# Patient Record
Sex: MALE | Race: BLACK OR AFRICAN AMERICAN | NOT HISPANIC OR LATINO | Employment: FULL TIME | URBAN - METROPOLITAN AREA
[De-identification: names, ages, dates, MRNs, and addresses within clinical notes are randomized per-mention and may not be internally consistent; named-entity substitution may affect disease eponyms.]

---

## 2018-01-18 ENCOUNTER — HOSPITAL ENCOUNTER (EMERGENCY)
Facility: HOSPITAL | Age: 23
Discharge: HOME | End: 2018-01-18
Attending: EMERGENCY MEDICINE

## 2018-01-18 VITALS
WEIGHT: 262 LBS | TEMPERATURE: 98 F | OXYGEN SATURATION: 99 % | SYSTOLIC BLOOD PRESSURE: 127 MMHG | RESPIRATION RATE: 16 BRPM | HEIGHT: 70 IN | DIASTOLIC BLOOD PRESSURE: 58 MMHG | BODY MASS INDEX: 37.51 KG/M2 | HEART RATE: 67 BPM

## 2018-01-18 DIAGNOSIS — M20.12 HALLUX VALGUS OF LEFT FOOT: Primary | ICD-10-CM

## 2018-01-18 DIAGNOSIS — M25.511 CHRONIC RIGHT SHOULDER PAIN: ICD-10-CM

## 2018-01-18 DIAGNOSIS — S90.32XA CONTUSION OF LEFT HEEL, INITIAL ENCOUNTER: ICD-10-CM

## 2018-01-18 DIAGNOSIS — G89.29 CHRONIC RIGHT SHOULDER PAIN: ICD-10-CM

## 2018-01-18 DIAGNOSIS — M79.672 PAIN OF LEFT HEEL: ICD-10-CM

## 2018-01-18 DIAGNOSIS — M79.675 GREAT TOE PAIN, LEFT: ICD-10-CM

## 2018-01-18 PROCEDURE — 63600175 PHARM REV CODE 636 W HCPCS: Performed by: NURSE PRACTITIONER

## 2018-01-18 PROCEDURE — 99283 EMERGENCY DEPT VISIT LOW MDM: CPT | Mod: 25

## 2018-01-18 PROCEDURE — 96372 THER/PROPH/DIAG INJ SC/IM: CPT

## 2018-01-18 RX ORDER — KETOROLAC TROMETHAMINE 30 MG/ML
30 INJECTION, SOLUTION INTRAMUSCULAR; INTRAVENOUS
Status: COMPLETED | OUTPATIENT
Start: 2018-01-18 | End: 2018-01-18

## 2018-01-18 RX ORDER — NAPROXEN 500 MG/1
500 TABLET ORAL 2 TIMES DAILY PRN
Qty: 30 TABLET | Refills: 0 | Status: SHIPPED | OUTPATIENT
Start: 2018-01-18

## 2018-01-18 RX ADMIN — KETOROLAC TROMETHAMINE 30 MG: 30 INJECTION, SOLUTION INTRAMUSCULAR at 02:01

## 2018-01-18 NOTE — DISCHARGE INSTRUCTIONS
Follow-up with orthopedics for your right shoulder pain. Follow-up with podiatry for your left toe pain. Take pain medication as needed and only as prescribed.      Return to the emergency department for any new or worsening symptoms or as needed.

## 2018-01-18 NOTE — ED PROVIDER NOTES
"Encounter Date: 1/18/2018    SCRIBE #1 NOTE: I, Rachel Gonsalez, am scribing for, and in the presence of,  JAIRO Freitas. I have scribed the following portions of the note - Other sections scribed: HPI/ROS .       History     Chief Complaint   Patient presents with    Shoulder Pain     Pt reports pain in right shoulder onset few months ago, denies injury.    Heel Pain     Pt reports dropped a dresser on left heal 2 wks ago, pain want stop.    Toe Pain     Pt left big toe pain onset 6 months ago while walking, no improvement.     Eye Problem     Pt also, reports both eyes are scratchy & irritated onset few days.      CC: Multiple Complaints    HPI: 22 y.o. M with no pertinent PMHx presents to the ED with multiple complaints.    1) Pt is c/o moderate L big toe pain with walking x6 months. Pt states he first noticed the pain while he was walking. Pt states, "I heard popped and then I saw swelling." Pain has not improved since onset. Pt is able to move his toe. Pt denies any erythema or warmth to the joint of the great toe.      2) Pt is c/o moderate L heel pain after dropping a dresser onto the back of his foot 2 weeks ago. Pt has not noticed any swelling or erythema. Pt has been able to ambulate since the incident.     3) Pt is c/o atraumatic, constant, moderate R shoulder pain x3 months. R shoulder pain is exacerbated with raising his R arm up. Pt reports pain feels better when he works out and that he has full ROM of his shoulder. Pt reports that he is going to the  and just wants to get everything checked out.     No prior tx attempted for any of his symptoms. Pt denies fever, chills, CP, SOB, and any other symptoms.         The history is provided by the patient.     Review of patient's allergies indicates:   Allergen Reactions    Pcn [penicillins] Swelling     Past Medical History:   Diagnosis Date    UTI (urinary tract infection)      Past Surgical History:   Procedure Laterality Date    WRIST " FRACTURE SURGERY       Family History   Problem Relation Age of Onset    Hypertension Mother      Social History   Substance Use Topics    Smoking status: Never Smoker    Smokeless tobacco: Never Used    Alcohol use Yes      Comment: occational      Review of Systems   Constitutional: Negative for chills and fever.   HENT: Negative for nosebleeds.    Eyes: Negative for pain.   Respiratory: Negative for cough and shortness of breath.    Cardiovascular: Negative for chest pain.   Gastrointestinal: Negative for diarrhea, nausea and vomiting.   Musculoskeletal: Negative for back pain and neck pain.        (+) R shoulder pain, L foot pain, L great toe pain w/ swelling   Skin: Negative for color change, rash and wound.   Neurological: Negative for weakness, numbness and headaches.       Physical Exam     Initial Vitals [01/18/18 0137]   BP Pulse Resp Temp SpO2   127/82 78 16 98 °F (36.7 °C) 98 %      MAP       97         Physical Exam    Nursing note and vitals reviewed.  Constitutional: He appears well-developed and well-nourished. He is not diaphoretic. No distress.   HENT:   Head: Normocephalic and atraumatic.   Right Ear: External ear normal.   Left Ear: External ear normal.   Nose: Nose normal.   Eyes: Conjunctivae and EOM are normal. Pupils are equal, round, and reactive to light. Right eye exhibits no discharge. Left eye exhibits no discharge. No scleral icterus.   Neck: Normal range of motion. Neck supple. No thyromegaly present. No tracheal deviation present. No JVD present.   Cardiovascular: Normal rate, regular rhythm, normal heart sounds and intact distal pulses. Exam reveals no gallop and no friction rub.    No murmur heard.  Pulmonary/Chest: Breath sounds normal. No stridor. No respiratory distress. He has no wheezes. He has no rhonchi. He has no rales. He exhibits no tenderness.   Abdominal: Soft. Bowel sounds are normal. He exhibits no distension and no mass. There is no tenderness. There is no rebound  and no guarding.   Musculoskeletal: Normal range of motion. He exhibits no edema.        Right shoulder: He exhibits tenderness and pain. He exhibits normal range of motion and no deformity.   Bunion of left great toe. No erythema, warmth, swelling, or any other abnormalities of the right shoulder or left heel   Lymphadenopathy:     He has no cervical adenopathy.   Neurological: He is alert and oriented to person, place, and time. He has normal strength and normal reflexes. He displays normal reflexes. No cranial nerve deficit or sensory deficit.   Skin: Skin is warm and dry. No rash and no abscess noted. No erythema. No pallor.   Psychiatric: He has a normal mood and affect. His behavior is normal. Judgment and thought content normal.         ED Course   Procedures  Labs Reviewed - No data to display          Medical Decision Making:   Differential Diagnosis:   Includes fracture, dislocation, Lisfranc injury, septic arthritis, gout, adhesive capsulitis, tendinitis, AC joint dislocation, others  Clinical Tests:   Radiological Study: Ordered and Reviewed  ED Management:  22-year-old male presenting for evaluation of chronic left great toe pain that began 6 months ago, chronic right shoulder pain that began 3 months ago, and left heel pain that began 2 weeks ago. He denies trauma to the left great toe and right shoulder, however he reports that left heel pain began after he accidentally dropped a piece of furniture onto it. Pain in the left heel and left toe are exacerbated by weightbearing and ambulation, however he is ambulating with a steady gait without difficulty. There is no erythema, warmth, or other abnormality. Doubt septic joint or gout. Right shoulder pain is also atraumatic and is exacerbated by palpation and lifting his shoulder above his head. Full active range of motion of the right shoulder is intact. There is no deformity, erythema, warmth, or any other abnormality noted to the right shoulder. X-ray  of the right shoulder is unremarkable. X-ray of the left foot is unremarkable for acute abnormality however there is prominent hallux valgus. No findings concerning for acute emergent condition. Patient is safe for discharge. Treated with Toradol. Recommend patient follow-up with orthopedics and podiatry. ED return precautions given. Patient expressed understanding of diagnosis and discharge instructions.  Other:   I have discussed this case with another health care provider.       <> Summary of the Discussion: Case discussed with my attending physician who agreed with the assessment and plan.            Scribe Attestation:   Scribe #1: I performed the above scribed service and the documentation accurately describes the services I performed. I attest to the accuracy of the note.    Attending Attestation:           Physician Attestation for Scribe:  Physician Attestation Statement for Scribe #1: I, Abiel Bullard NP-C, reviewed documentation, as scribed by Rachel Gonsalez in my presence, and it is both accurate and complete.                 ED Course      Clinical Impression:   The primary encounter diagnosis was Hallux valgus of left foot. Diagnoses of Pain of left heel, Great toe pain, left, Chronic right shoulder pain, and Contusion of left heel, initial encounter were also pertinent to this visit.    Disposition:   Disposition: Discharged  Condition: Stable                        Abiel Bullard NP  01/18/18 0317

## 2018-01-18 NOTE — ED TRIAGE NOTES
"Pt presents to ED with c/o right shoulder pain, left heel and left big toe pain, and irritated/scratchy eyes. Pt states he dropped a dresser on his left heel and his big toe "looks deformed" "I dont know if it always has been curved like that, it swole up before." Pt has full ROM of shoulder  "

## 2018-05-18 ENCOUNTER — HOSPITAL ENCOUNTER (EMERGENCY)
Facility: HOSPITAL | Age: 23
Discharge: HOME | End: 2018-05-18
Attending: EMERGENCY MEDICINE

## 2018-05-18 VITALS
HEART RATE: 80 BPM | OXYGEN SATURATION: 96 % | BODY MASS INDEX: 35.79 KG/M2 | HEIGHT: 70 IN | DIASTOLIC BLOOD PRESSURE: 76 MMHG | TEMPERATURE: 99 F | SYSTOLIC BLOOD PRESSURE: 153 MMHG | WEIGHT: 250 LBS | RESPIRATION RATE: 20 BRPM

## 2018-05-18 DIAGNOSIS — R05.9 COUGH: ICD-10-CM

## 2018-05-18 DIAGNOSIS — J32.9 SINUSITIS, UNSPECIFIED CHRONICITY, UNSPECIFIED LOCATION: Primary | ICD-10-CM

## 2018-05-18 DIAGNOSIS — J40 BRONCHITIS: ICD-10-CM

## 2018-05-18 LAB — HETEROPH AB SERPL QL IA: NEGATIVE

## 2018-05-18 PROCEDURE — 96372 THER/PROPH/DIAG INJ SC/IM: CPT

## 2018-05-18 PROCEDURE — 99284 EMERGENCY DEPT VISIT MOD MDM: CPT | Mod: 25

## 2018-05-18 PROCEDURE — 63600175 PHARM REV CODE 636 W HCPCS: Performed by: PHYSICIAN ASSISTANT

## 2018-05-18 PROCEDURE — 86308 HETEROPHILE ANTIBODY SCREEN: CPT

## 2018-05-18 PROCEDURE — 25000003 PHARM REV CODE 250: Performed by: PHYSICIAN ASSISTANT

## 2018-05-18 RX ORDER — ALBUTEROL SULFATE 90 UG/1
1-2 AEROSOL, METERED RESPIRATORY (INHALATION) EVERY 6 HOURS PRN
Qty: 1 INHALER | Refills: 1 | Status: SHIPPED | OUTPATIENT
Start: 2018-05-18 | End: 2019-05-18

## 2018-05-18 RX ORDER — DOXYCYCLINE 100 MG/1
100 CAPSULE ORAL 2 TIMES DAILY
Qty: 20 CAPSULE | Refills: 0 | Status: SHIPPED | OUTPATIENT
Start: 2018-05-18 | End: 2018-05-28

## 2018-05-18 RX ORDER — KETOROLAC TROMETHAMINE 30 MG/ML
10 INJECTION, SOLUTION INTRAMUSCULAR; INTRAVENOUS
Status: COMPLETED | OUTPATIENT
Start: 2018-05-18 | End: 2018-05-18

## 2018-05-18 RX ORDER — BENZONATATE 100 MG/1
100 CAPSULE ORAL
Status: COMPLETED | OUTPATIENT
Start: 2018-05-18 | End: 2018-05-18

## 2018-05-18 RX ORDER — BENZONATATE 100 MG/1
100 CAPSULE ORAL 3 TIMES DAILY PRN
Qty: 20 CAPSULE | Refills: 0 | Status: SHIPPED | OUTPATIENT
Start: 2018-05-18 | End: 2018-05-28

## 2018-05-18 RX ORDER — IBUPROFEN 600 MG/1
600 TABLET ORAL EVERY 6 HOURS PRN
Qty: 20 TABLET | Refills: 0 | Status: SHIPPED | OUTPATIENT
Start: 2018-05-18

## 2018-05-18 RX ADMIN — KETOROLAC TROMETHAMINE 10 MG: 30 INJECTION INTRAMUSCULAR; INTRAVENOUS at 12:05

## 2018-05-18 RX ADMIN — BENZONATATE 100 MG: 100 CAPSULE ORAL at 12:05

## 2018-05-18 NOTE — ED PROVIDER NOTES
Encounter Date: 5/18/2018  This is an initial triage evaluation of Dariusz aGrcia, a 22 y.o., male  that presents to the Emergency Department with c/o coughing, sneezing, body aches ×2 weeks.    Pertinent exam findings: Enlarged, nontender, preauricular lymph node left side    Orders Pending : None    Destination: Q track    I have evaluated and provided a medical screening exam with initial orders placed, if indicated, to expedite care. The patient is stable to return to the waiting area and will be placed in a treatment area when one is available. Care will be transferred to an alternate provider when patient is roomed from the lobby for full assessment including: history, physical exam, additional orders, and final disposition.      BROOKLYNN Wilder-BOOGIE        History     Chief Complaint   Patient presents with    Cough     cough, headache, and bodyaches x 2 wks; left side facial swelling    Generalized Body Aches      This is a 22-year-old male with no significant medical history who presents with sinus congestion, sore throat, cough, body aches x2 weeks.  He reports it started with mild sinus congestion and sore throat, but then progressed to thick yellow mucus from his nose with productive cough.  He denies fever at home but does report chills.  No sick contacts  Prior to symptoms.  He denies shortness of breath, difficulty swallowing, nausea vomiting, or diarrhea.          Review of patient's allergies indicates:   Allergen Reactions    Pcn [penicillins] Swelling     Past Medical History:   Diagnosis Date    UTI (urinary tract infection)      Past Surgical History:   Procedure Laterality Date    WRIST FRACTURE SURGERY       Family History   Problem Relation Age of Onset    Hypertension Mother      Social History   Substance Use Topics    Smoking status: Never Smoker    Smokeless tobacco: Never Used    Alcohol use Yes      Comment: occational      Review of Systems   Constitutional: Positive for  chills. Negative for fever.   HENT: Positive for congestion, rhinorrhea, sinus pain and sore throat.    Respiratory: Positive for cough. Negative for shortness of breath.    Cardiovascular: Negative for chest pain.   Gastrointestinal: Negative for nausea.   Genitourinary: Negative for dysuria.   Musculoskeletal: Positive for myalgias. Negative for back pain.   Skin: Negative for rash.   Neurological: Negative for weakness.   Hematological: Does not bruise/bleed easily.       Physical Exam     Initial Vitals [05/18/18 1157]   BP Pulse Resp Temp SpO2   131/64 80 20 98.6 °F (37 °C) 99 %      MAP       86.33         Physical Exam    Vitals reviewed.  Constitutional: He appears well-developed and well-nourished. He is not diaphoretic. No distress.   HENT:   Head: Normocephalic and atraumatic.   Right Ear: External ear normal.   Left Ear: External ear normal.   Nose: Mucosal edema and rhinorrhea present. Right sinus exhibits maxillary sinus tenderness. Right sinus exhibits no frontal sinus tenderness. Left sinus exhibits maxillary sinus tenderness. Left sinus exhibits no frontal sinus tenderness.   Mouth/Throat: Oropharynx is clear and moist. No oropharyngeal exudate.   Eyes: Conjunctivae are normal. No scleral icterus.   Neck: Normal range of motion. Neck supple.   Cardiovascular: Normal rate, regular rhythm, normal heart sounds and intact distal pulses.   Pulmonary/Chest: Breath sounds normal. No respiratory distress. He has no wheezes. He has no rhonchi. He has no rales. He exhibits no tenderness.   Abdominal: Soft. Bowel sounds are normal. He exhibits no distension and no mass. There is no tenderness. There is no rebound and no guarding.   Musculoskeletal: Normal range of motion.   Lymphadenopathy:     He has no cervical adenopathy.   Neurological: He is alert and oriented to person, place, and time.   Skin: Skin is warm and dry. No rash noted. No erythema.         ED Course   Procedures  Labs Reviewed   HETEROPHILE  AB SCREEN          X-Rays:   Independently Interpreted Readings:   Chest X-Ray: Normal heart size.  No infiltrates.     Medical Decision Making:   Initial Assessment:     22-year-old male complains of sinus congestion and pain with rhinorrhea, sore throat, cough, myalgias x2 weeks.  He presents nontoxic, afebrile, with normal heart rate.  He has mild maxillary sinus tenderness.  No clinical evidence of strep pharyngitis.  Lungs are clear with normal work of breathing.   ED Management:    Chest x-ray without obvious bacterial pneumonia.  Monospot negative.  Patient treated with  Doxycycline for bacterial sinusitis given duration of symptoms.   Patient treated supportively in the ED with mild improvement.  He was discharged with return precautions and instructions to follow up with PCP.  He verbalized understanding and agreed with the plan..                      Clinical Impression:   The primary encounter diagnosis was Sinusitis, unspecified chronicity, unspecified location. Diagnoses of Cough and Bronchitis were also pertinent to this visit.                           Junior Eng PA-C  05/18/18 1901

## 2019-01-05 ENCOUNTER — HOSPITAL ENCOUNTER (EMERGENCY)
Facility: HOSPITAL | Age: 24
Discharge: HOME | End: 2019-01-05
Attending: EMERGENCY MEDICINE
Payer: COMMERCIAL

## 2019-01-05 VITALS
HEIGHT: 70 IN | RESPIRATION RATE: 15 BRPM | WEIGHT: 244 LBS | BODY MASS INDEX: 34.93 KG/M2 | TEMPERATURE: 98 F | HEART RATE: 64 BPM | DIASTOLIC BLOOD PRESSURE: 69 MMHG | OXYGEN SATURATION: 100 % | SYSTOLIC BLOOD PRESSURE: 126 MMHG

## 2019-01-05 DIAGNOSIS — H01.004 BLEPHARITIS OF LEFT UPPER EYELID, UNSPECIFIED TYPE: Primary | ICD-10-CM

## 2019-01-05 PROCEDURE — 99284 EMERGENCY DEPT VISIT MOD MDM: CPT

## 2019-01-05 PROCEDURE — 25000003 PHARM REV CODE 250: Performed by: PHYSICIAN ASSISTANT

## 2019-01-05 RX ORDER — ERYTHROMYCIN 5 MG/G
OINTMENT OPHTHALMIC
Qty: 1 TUBE | Refills: 0 | Status: SHIPPED | OUTPATIENT
Start: 2019-01-05

## 2019-01-05 RX ORDER — ERYTHROMYCIN 5 MG/G
OINTMENT OPHTHALMIC
Status: COMPLETED | OUTPATIENT
Start: 2019-01-05 | End: 2019-01-05

## 2019-01-05 RX ORDER — IBUPROFEN 600 MG/1
600 TABLET ORAL
Status: COMPLETED | OUTPATIENT
Start: 2019-01-05 | End: 2019-01-05

## 2019-01-05 RX ORDER — IBUPROFEN 600 MG/1
600 TABLET ORAL EVERY 6 HOURS PRN
Qty: 20 TABLET | Refills: 0 | Status: SHIPPED | OUTPATIENT
Start: 2019-01-05

## 2019-01-05 RX ADMIN — ERYTHROMYCIN 1 INCH: 5 OINTMENT OPHTHALMIC at 08:01

## 2019-01-05 RX ADMIN — IBUPROFEN 600 MG: 600 TABLET ORAL at 08:01

## 2019-01-05 NOTE — ED TRIAGE NOTES
Patient report left eye swelling x 2 days.. Patient reports that eye is only painful when touched or when he blinks.  Patient denies vision changes. Swelling noted to left eye.

## 2019-01-05 NOTE — ED PROVIDER NOTES
Encounter Date: 1/5/2019    SCRIBE #1 NOTE: I, Cydney Jesus, am scribing for, and in the presence of,  Junior Eng PA-C. I have scribed the following portions of the note - Other sections scribed: HPI, ROS.       History     Chief Complaint   Patient presents with    Facial Swelling     left eye swelling x several days.   + pain, denies itching.  denies vision changes.     CC: Facial Swelling    HPI: This is a 24 y/o male with no pertinent PMHx who presents to the ED for emergent evaluation of acute onset left eyelid swelling and pain that began x2 days ago. Pt reports that he began to have mild pain to palpation of eyelid x3 days ago, but pain became more severe x2 days ago when swelling onset. Pain and swelling have both gradually worsened each day. Pt reports that he took Benadryl with no relief of symptoms. Pt notes that blinking is painful. Pt notes hx of styes. Pt denies vision changes, itching, or further symptoms.       The history is provided by the patient. No  was used.     Review of patient's allergies indicates:   Allergen Reactions    Pcn [penicillins] Swelling     Past Medical History:   Diagnosis Date    UTI (urinary tract infection)      Past Surgical History:   Procedure Laterality Date    WRIST FRACTURE SURGERY       Family History   Problem Relation Age of Onset    Hypertension Mother      Social History     Tobacco Use    Smoking status: Never Smoker    Smokeless tobacco: Never Used   Substance Use Topics    Alcohol use: Yes     Comment: occational     Drug use: No     Review of Systems   Constitutional: Negative for chills and fever.   HENT: Negative for congestion, ear pain, rhinorrhea and sore throat.    Eyes: Positive for pain (to left eyelid). Negative for visual disturbance.        (+) left eyelid swelling   Respiratory: Negative for cough and shortness of breath.    Cardiovascular: Negative for chest pain.   Gastrointestinal: Negative for abdominal pain,  diarrhea, nausea and vomiting.   Genitourinary: Negative for dysuria.   Musculoskeletal: Negative for back pain and neck pain.   Skin: Negative for rash.   Neurological: Negative for headaches.       Physical Exam     Initial Vitals [01/05/19 0739]   BP Pulse Resp Temp SpO2   126/69 64 15 98 °F (36.7 °C) 100 %      MAP       --         Physical Exam    Vitals reviewed.  Constitutional: He appears well-developed and well-nourished. He is not diaphoretic. No distress.   HENT:   Head: Normocephalic and atraumatic.   Right Ear: External ear normal.   Left Ear: External ear normal.   Nose: Nose normal.   Eyes: Conjunctivae and EOM are normal. Pupils are equal, round, and reactive to light. Lids are everted and swept, no foreign bodies found. Right eye exhibits no chemosis, no discharge, no exudate and no hordeolum. No foreign body present in the right eye. Left eye exhibits no chemosis, no discharge, no exudate and no hordeolum. No foreign body present in the left eye. Right conjunctiva is not injected. Right conjunctiva has no hemorrhage. Left conjunctiva is not injected. Left conjunctiva has no hemorrhage. No scleral icterus.   Left upper eyelid with generalized edema and mild erythema.  No discrete lesions to the anterior or posterior lid.  No pain with ocular movement.  No proptosis.   Neck: Normal range of motion. Neck supple.   Cardiovascular: Normal rate, regular rhythm and intact distal pulses.   Pulmonary/Chest: No respiratory distress.   Musculoskeletal: Normal range of motion.   Neurological: He is alert and oriented to person, place, and time.   Skin: Skin is warm and dry. No rash noted.         ED Course   Procedures  Labs Reviewed - No data to display       Imaging Results    None          Medical Decision Making:   ED Management:  23-year-old male with left eyelid pain and swelling x2 days.  He has no ocular pain, pain with ocular movement, decreased vision, or crusting.  His left upper eyelid has  generalized edema and mild erythema.  No discrete lesions or foreign bodies identified.  Will treat for blepharitis with erythromycin ointment and ibuprofen.  After considered but at this time do not suspect orbital cellulitis, periorbital cellulitis, stye, other serious pathology.  Patient given return precautions and follow-up instructions.  She verbalized understanding and agreed with plan.            Scribe Attestation:   Scribe #1: I performed the above scribed service and the documentation accurately describes the services I performed. I attest to the accuracy of the note.    Attending Attestation:           Physician Attestation for Scribe:  Physician Attestation Statement for Scribe #1: I, Junior Eng PA-C, reviewed documentation, as scribed by Cydney Jesus in my presence, and it is both accurate and complete.                    Clinical Impression:   The encounter diagnosis was Blepharitis of left upper eyelid, unspecified type.                             Junior Eng PA-C  01/05/19 1143

## 2019-01-14 ENCOUNTER — HOSPITAL ENCOUNTER (EMERGENCY)
Facility: HOSPITAL | Age: 24
Discharge: HOME | End: 2019-01-14
Attending: EMERGENCY MEDICINE

## 2019-01-14 ENCOUNTER — NURSE TRIAGE (OUTPATIENT)
Dept: ADMINISTRATIVE | Facility: CLINIC | Age: 24
End: 2019-01-14

## 2019-01-14 VITALS
BODY MASS INDEX: 34.79 KG/M2 | RESPIRATION RATE: 16 BRPM | WEIGHT: 243 LBS | HEART RATE: 65 BPM | TEMPERATURE: 99 F | OXYGEN SATURATION: 100 % | DIASTOLIC BLOOD PRESSURE: 76 MMHG | HEIGHT: 70 IN | SYSTOLIC BLOOD PRESSURE: 126 MMHG

## 2019-01-14 DIAGNOSIS — K59.00 CONSTIPATION, UNSPECIFIED CONSTIPATION TYPE: Primary | ICD-10-CM

## 2019-01-14 PROCEDURE — 99283 EMERGENCY DEPT VISIT LOW MDM: CPT

## 2019-01-14 NOTE — ED PROVIDER NOTES
Encounter Date: 1/14/2019    SCRIBE #1 NOTE: I, Julisa Rivera, am scribing for, and in the presence of,  Renuka North MD. I have scribed the following portions of the note - Other sections scribed: HPI, ROS.       History     Chief Complaint   Patient presents with    Constipation     no BM x 4 days.  complaints of lower abd pains.   denies n/v or fever.     CC: Constipation    HPI: This is a 23 y.o. M who has no pertinent PMHx who presents to the ED for emergent evaluation of constipation x's 4 days. Pt notes flatulence this morning and lower abdominal cramping. He took an enema without relief. He states that he has been eating and drinking okay. Pt reports that he does not have a frequent Hx of constipation. He was last constipated last week and once again 6 years ago. Pt denies fever, cough, sore throat, vomiting, difficulty urinating, or Hx of abdominal surgery.      The history is provided by the patient. No  was used.     Review of patient's allergies indicates:   Allergen Reactions    Pcn [penicillins] Swelling     Past Medical History:   Diagnosis Date    UTI (urinary tract infection)      Past Surgical History:   Procedure Laterality Date    WRIST FRACTURE SURGERY       Family History   Problem Relation Age of Onset    Hypertension Mother      Social History     Tobacco Use    Smoking status: Never Smoker    Smokeless tobacco: Never Used   Substance Use Topics    Alcohol use: Yes     Comment: occational     Drug use: No     Review of Systems   Constitutional: Negative for chills and fever.   HENT: Negative for ear pain and sore throat.    Eyes: Negative for pain.   Respiratory: Negative for cough and shortness of breath.    Cardiovascular: Negative for chest pain.   Gastrointestinal: Positive for constipation. Negative for abdominal pain, diarrhea, nausea and vomiting.   Genitourinary: Negative for difficulty urinating and dysuria.   Musculoskeletal: Negative for back pain.    Skin: Negative for rash.   Neurological: Negative for headaches.       Physical Exam     Initial Vitals [01/14/19 0824]   BP Pulse Resp Temp SpO2   131/66 66 16 98.6 °F (37 °C) 98 %      MAP       --         Physical Exam    Constitutional: He appears well-developed and well-nourished. He is not diaphoretic. No distress.   HENT:   Mouth/Throat: Oropharynx is clear and moist.   Eyes: Pupils are equal, round, and reactive to light.   Neck: Neck supple.   Cardiovascular: Normal rate and regular rhythm.   Pulmonary/Chest: No respiratory distress.   Abdominal: Soft. Bowel sounds are normal. There is no tenderness.   Genitourinary: Rectal exam shows no external hemorrhoid, no internal hemorrhoid, no mass, no tenderness, anal tone normal and guaiac negative stool. Guaiac negative stool. : Acceptable.  Genitourinary Comments: QUINN performed with RN present as chaperone   Neurological: He is alert.   Skin: Skin is warm and dry.   Psychiatric: He has a normal mood and affect.         ED Course   Procedures  Labs Reviewed - No data to display       Imaging Results    None          Medical Decision Making:   Initial Assessment:   23-year-old male presents with 4 days of constipation.  Abdomen is soft, nondistended. Rectal exam without acute finding.  No relief with enema performed at home.  No fever or vomiting, patient is still passing flatness.  I do not suspect acute bowel obstruction versus appendicitis versus the enteritis versus volvulus.  I advised use of milk of magnesia.  I will also prescribe lactulose if milk of magnesia not effective.  I have referred him to primary care for further evaluation ongoing care.            Scribe Attestation:   Scribe #1: I performed the above scribed service and the documentation accurately describes the services I performed. I attest to the accuracy of the note.    Attending Attestation:           Physician Attestation for Scribe:  Physician Attestation Statement for  Scribe #1: I, Renuka North MD, reviewed documentation, as scribed by Julisa Rivera in my presence, and it is both accurate and complete.                    Clinical Impression:   The encounter diagnosis was Constipation, unspecified constipation type.                             Renuka North MD  01/14/19 0846

## 2019-01-14 NOTE — TELEPHONE ENCOUNTER
"Was seen this am for fecal impaction, prescribed lactulose, but to try MOM first. The lactulose is $300 and he is unable to afford it.  He has already taking stool softness, and an enema, as well.  He feels the urge to go, but it's so painful that he is unable to pass it.  S/w Dr. North in the ED, she advises Mag citrate and glycerine suppository, advised pt of the instructions and to call back if no success.    Reason for Disposition   Caller has URGENT medication question about med that PCP prescribed and triager unable to answer question    Answer Assessment - Initial Assessment Questions  1. SYMPTOMS: "Do you have any symptoms?"      Constipation/fecal impaction  2. SEVERITY: If symptoms are present, ask "Are they mild, moderate or severe?"      Painful rectum, unable to pass stool.  Was seen in Ed this am and given rx for lactulose, but it is $300 and he can't afford it.    Protocols used: ST MEDICATION QUESTION CALL-A-      "

## 2019-04-08 ENCOUNTER — HOSPITAL ENCOUNTER (EMERGENCY)
Facility: HOSPITAL | Age: 24
Discharge: HOME | End: 2019-04-09
Attending: EMERGENCY MEDICINE

## 2019-04-08 DIAGNOSIS — J02.0 STREP PHARYNGITIS: Primary | ICD-10-CM

## 2019-04-08 DIAGNOSIS — R07.9 CHEST PAIN: ICD-10-CM

## 2019-04-08 LAB
CTP QC/QA: YES
DEPRECATED S PYO AG THROAT QL EIA: POSITIVE
FLUAV AG NPH QL: NEGATIVE
FLUBV AG NPH QL: NEGATIVE

## 2019-04-08 PROCEDURE — 63600175 PHARM REV CODE 636 W HCPCS: Performed by: PHYSICIAN ASSISTANT

## 2019-04-08 PROCEDURE — 93010 ELECTROCARDIOGRAM REPORT: CPT | Mod: ,,, | Performed by: INTERNAL MEDICINE

## 2019-04-08 PROCEDURE — 93010 EKG 12-LEAD: ICD-10-PCS | Mod: ,,, | Performed by: INTERNAL MEDICINE

## 2019-04-08 PROCEDURE — 93005 ELECTROCARDIOGRAM TRACING: CPT

## 2019-04-08 PROCEDURE — 99284 EMERGENCY DEPT VISIT MOD MDM: CPT | Mod: 25

## 2019-04-08 PROCEDURE — 96372 THER/PROPH/DIAG INJ SC/IM: CPT

## 2019-04-08 PROCEDURE — 87880 STREP A ASSAY W/OPTIC: CPT

## 2019-04-08 PROCEDURE — 87804 INFLUENZA ASSAY W/OPTIC: CPT | Mod: 59

## 2019-04-08 RX ORDER — AZITHROMYCIN 250 MG/1
TABLET, FILM COATED ORAL
Qty: 6 TABLET | Refills: 0 | Status: SHIPPED | OUTPATIENT
Start: 2019-04-08 | End: 2020-12-14

## 2019-04-08 RX ORDER — KETOROLAC TROMETHAMINE 30 MG/ML
15 INJECTION, SOLUTION INTRAMUSCULAR; INTRAVENOUS
Status: COMPLETED | OUTPATIENT
Start: 2019-04-08 | End: 2019-04-08

## 2019-04-08 RX ADMIN — KETOROLAC TROMETHAMINE 15 MG: 30 INJECTION, SOLUTION INTRAMUSCULAR at 09:04

## 2019-04-09 VITALS
TEMPERATURE: 99 F | OXYGEN SATURATION: 98 % | WEIGHT: 250 LBS | BODY MASS INDEX: 35.79 KG/M2 | HEIGHT: 70 IN | RESPIRATION RATE: 18 BRPM | HEART RATE: 68 BPM | DIASTOLIC BLOOD PRESSURE: 76 MMHG | SYSTOLIC BLOOD PRESSURE: 120 MMHG

## 2019-04-09 NOTE — ED PROVIDER NOTES
Encounter Date: 4/8/2019       History     Chief Complaint   Patient presents with    Sore Throat     sore throat, fever, headache since Thursday. Denies cough, nv.    Chest Pain     intermittent Midsternal chest pain since Thursday     Chief Complaint:  Sore throat/chest pain  History of  Present Illness: History obtained from patient. This 23 y.o. male who has no known past medical history presents to the ED complaining of sore throat for 5 days with associated generalized headache, congestion and subjective fever.  He also complains of intermittent midsternal chest pain for similar duration.  Denies nausea, vomiting, diarrhea, abdominal pain, shortness of breath        Review of patient's allergies indicates:   Allergen Reactions    Pcn [penicillins] Swelling     Past Medical History:   Diagnosis Date    UTI (urinary tract infection)      Past Surgical History:   Procedure Laterality Date    WRIST FRACTURE SURGERY       Family History   Problem Relation Age of Onset    Hypertension Mother      Social History     Tobacco Use    Smoking status: Never Smoker    Smokeless tobacco: Never Used   Substance Use Topics    Alcohol use: Yes     Comment: occational     Drug use: No     Review of Systems   Constitutional: Positive for fever. Negative for chills.   HENT: Positive for congestion and sore throat. Negative for rhinorrhea.    Eyes: Negative for visual disturbance.   Respiratory: Negative for cough and shortness of breath.    Cardiovascular: Positive for chest pain.   Gastrointestinal: Negative for abdominal pain, diarrhea, nausea and vomiting.   Genitourinary: Negative for dysuria, frequency and hematuria.   Musculoskeletal: Positive for myalgias. Negative for back pain.   Skin: Negative for rash.   Neurological: Negative for dizziness, weakness and headaches.       Physical Exam     Initial Vitals [04/08/19 2024]   BP Pulse Resp Temp SpO2   (!) 141/80 81 17 98.5 °F (36.9 °C) 99 %      MAP       --          Physical Exam    Nursing note and vitals reviewed.  Constitutional: He appears well-developed and well-nourished. No distress.   HENT:   Head: Normocephalic and atraumatic.   Right Ear: Tympanic membrane normal.   Left Ear: Tympanic membrane normal.   Nose: Nose normal.   Mouth/Throat: Uvula is midline and mucous membranes are normal. Posterior oropharyngeal erythema present.   There is bilateral tonsillar enlargement without exudates.   Eyes: EOM are normal. Pupils are equal, round, and reactive to light.   Neck: Normal range of motion. Neck supple.   Cardiovascular: Normal rate, regular rhythm and normal heart sounds. Exam reveals no gallop and no friction rub.    No murmur heard.  Pulmonary/Chest: Breath sounds normal. No respiratory distress. He has no wheezes. He has no rhonchi. He has no rales.   Abdominal: Soft. Bowel sounds are normal. He exhibits no mass. There is no tenderness. There is no rebound and no guarding.   Musculoskeletal: Normal range of motion.   Lymphadenopathy:     He has cervical adenopathy.   Neurological: He is alert and oriented to person, place, and time.   Skin: Skin is warm and dry. Capillary refill takes less than 2 seconds.   Psychiatric: He has a normal mood and affect.         ED Course   Procedures  Labs Reviewed   THROAT SCREEN, RAPID - Abnormal; Notable for the following components:       Result Value    Rapid Strep A Screen Positive (*)     All other components within normal limits   POCT INFLUENZA A/B     EKG Readings: (Independently Interpreted)   Initial Reading: No STEMI. Rhythm: Sinus Bradycardia. Heart Rate: 57. Ectopy: No Ectopy. Conduction: Normal. ST Segments: Normal ST Segments. T Waves: Normal. Axis: Normal. Clinical Impression: Sinus Bradycardia     ECG Results          EKG 12-lead (Final result)  Result time 04/09/19 15:38:44    Final result by Interface, Lab In [facility] (04/09/19 15:38:44)                 Narrative:    Test Reason : R07.9,    Vent. Rate : 074  BPM     Atrial Rate : 074 BPM     P-R Int : 144 ms          QRS Dur : 106 ms      QT Int : 368 ms       P-R-T Axes : 060 024 -04 degrees     QTc Int : 408 ms    Normal sinus rhythm with sinus arrhythmia  Cannot rule out Anterior infarct ,age undetermined  Abnormal ECG  No previous ECGs available  Confirmed by Parish Hatch MD ([id number]) on 4/9/2019 3:38:31 PM    Referred By: [email or web address]   SELF           Confirmed By:Parish Hatch MD                             EKG 12-LEAD (Final result)  Result time 04/11/19 14:57:47    Final result by Unknown User (04/11/19 14:57:47)                                Imaging Results          X-Ray Chest PA And Lateral (Final result)  Result time 04/08/19 21:38:55    Final result by Kasi Zhou MD (04/08/19 21:38:55)                 Impression:      1. No acute cardiopulmonary process, hypoventilatory exam.      Electronically signed by: Kasi Zhou MD  Date:    04/08/2019  Time:    21:38             Narrative:    EXAMINATION:  XR CHEST PA AND LATERAL    CLINICAL HISTORY:  Chest pain, unspecified    TECHNIQUE:  PA and lateral views of the chest were performed.    COMPARISON:  05/18/2018    FINDINGS:  The cardiomediastinal silhouette is not enlarged.  There is no pleural effusion.  The trachea is midline.  The lungs are symmetrically expanded bilaterally without evidence of acute parenchymal process. No large focal consolidation seen.  There is no pneumothorax.  The osseous structures are unremarkable.                                 Medical Decision Making:   Clinical Tests:   Lab Tests: Ordered and Reviewed  Radiological Study: Ordered and Reviewed  ED Management:  23 year old patient with no significant past medical history presenting secondary to chest pain and sore throat. Patient is at lower risk of ACS due to risk factors and HPI with a heart score of 0.  Posterior oropharynx is erythematous without tonsillar edema or exudates. EKG was reassuring and chest xray showed  nothing acute. Most likely musculoskeletal cause of patient.  Patient positive for strep.  Influenza negative.  No history of immunocompromise. Pt euvolemic w no trismus and no airway compromise. Able to tolerate PO.      PE: normal rate, no sob/recent immobilization/surgery/travel/family history. PERC negative.  Pneumonia: chest xray negative. No fever. No cough and lungs non consistent with pna  Tamponade: unlikely due to chest xray and ekg  STEMI: No STEMI on ekg  Dissection: equal pulses bilaterally and no ripping chest pain to the back  Esophageal rupture: no dysphagia or vomiting and chest xray negative for mediastinal air  Arrhythmia: no arrhythmia on ekg  CHF: no fluid overload on Cxr and physical exam  Pneumothorax: bilateral breath sounds and no signs of pneumothorax on chest xray   Strep throat: No LAD, cough present, afebrile, no pharyngeal exudate  EBV/Mono: No prolonged course, no posterior LAD, no splenomegaly  HIV: No LAD, No GI upset, no skin rash, not sexually active, not IVDU  Peritonsillar abscess: No LAD, no hot potato voice, no uvular displacement, no redness or swelling in tonsillar area, afebrile  Retropharyngeal abscess: No neck pain, no dysphagia, No LAD, no croup like cough, afebrile  No obstructive processes such as obstructive goiter or ludwigs angina    Plan to DC home with azithromycin given penicillin allergy. Return precautions given, patient understands and agrees with plan. All questions answered.  Instructed to follow up with PCP.    Patient felt improved with interventions and has a lower risk of impending cardiac failure to the heart score of 0. Patient was discharged with follow up with primary care. Return precautions given, patient understands and agrees with plan. All questions answered.  Instructed to follow up with PCP.                         Clinical Impression:       ICD-10-CM ICD-9-CM   1. Strep pharyngitis J02.0 034.0   2. Chest pain R07.9 786.50                                 Elmer Walls PA-C  04/09/19 0305       Elmer Walls PA-C  04/26/19 0314

## 2019-04-09 NOTE — ED TRIAGE NOTES
Pt here for midsternal chest pain that comes and goes. Hx acid reflux. C/o sore throat. Using otc meds without relief.

## 2020-03-18 ENCOUNTER — OFFICE VISIT (OUTPATIENT)
Dept: FAMILY MEDICINE | Facility: CLINIC | Age: 25
End: 2020-03-18
Payer: COMMERCIAL

## 2020-03-18 VITALS
HEIGHT: 70 IN | TEMPERATURE: 99 F | BODY MASS INDEX: 38.41 KG/M2 | HEART RATE: 77 BPM | WEIGHT: 268.31 LBS | OXYGEN SATURATION: 98 % | DIASTOLIC BLOOD PRESSURE: 76 MMHG | SYSTOLIC BLOOD PRESSURE: 120 MMHG

## 2020-03-18 DIAGNOSIS — E66.01 SEVERE OBESITY (BMI 35.0-39.9) WITH COMORBIDITY: ICD-10-CM

## 2020-03-18 DIAGNOSIS — J06.9 UPPER RESPIRATORY INFECTION WITH COUGH AND CONGESTION: Primary | ICD-10-CM

## 2020-03-18 PROCEDURE — 99999 PR PBB SHADOW E&M-EST. PATIENT-LVL III: ICD-10-PCS | Mod: PBBFAC,,, | Performed by: INTERNAL MEDICINE

## 2020-03-18 PROCEDURE — 99999 PR PBB SHADOW E&M-EST. PATIENT-LVL III: CPT | Mod: PBBFAC,,, | Performed by: INTERNAL MEDICINE

## 2020-03-18 PROCEDURE — 99204 PR OFFICE/OUTPT VISIT, NEW, LEVL IV, 45-59 MIN: ICD-10-PCS | Mod: S$GLB,,, | Performed by: INTERNAL MEDICINE

## 2020-03-18 PROCEDURE — 99204 OFFICE O/P NEW MOD 45 MIN: CPT | Mod: S$GLB,,, | Performed by: INTERNAL MEDICINE

## 2020-03-18 RX ORDER — PROMETHAZINE HYDROCHLORIDE AND DEXTROMETHORPHAN HYDROBROMIDE 6.25; 15 MG/5ML; MG/5ML
5 SYRUP ORAL 2 TIMES DAILY PRN
Qty: 120 ML | Refills: 0 | Status: SHIPPED | OUTPATIENT
Start: 2020-03-18 | End: 2020-03-28

## 2020-03-18 NOTE — PROGRESS NOTES
SUBJECTIVE     Chief Complaint   Patient presents with    Establish Care       HPI  Dariusz Neo Garcia is a 24 y.o. male with multiple medical diagnoses as listed in the medical history and problem list that presents for evaluation of URI since Saturday. Pt reports nasal/chest congestion, productive cough, headache, and post-nasal drip. Denies any fever, chills, or night sweats. Pt has been taking Nighttime Cold and Flu. Denies any recent travel. +sick contacts(entire family/household with URI).    PAST MEDICAL HISTORY:  Past Medical History:   Diagnosis Date    UTI (urinary tract infection)        PAST SURGICAL HISTORY:  Past Surgical History:   Procedure Laterality Date    WRIST FRACTURE SURGERY         SOCIAL HISTORY:  Social History     Socioeconomic History    Marital status: Single     Spouse name: Not on file    Number of children: Not on file    Years of education: Not on file    Highest education level: Not on file   Occupational History    Not on file   Social Needs    Financial resource strain: Not on file    Food insecurity:     Worry: Not on file     Inability: Not on file    Transportation needs:     Medical: Not on file     Non-medical: Not on file   Tobacco Use    Smoking status: Never Smoker    Smokeless tobacco: Never Used   Substance and Sexual Activity    Alcohol use: Yes     Comment: occational     Drug use: No    Sexual activity: Yes     Partners: Female     Birth control/protection: Condom   Lifestyle    Physical activity:     Days per week: Not on file     Minutes per session: Not on file    Stress: Not at all   Relationships    Social connections:     Talks on phone: Not on file     Gets together: Not on file     Attends Religion service: Not on file     Active member of club or organization: Not on file     Attends meetings of clubs or organizations: Not on file     Relationship status: Not on file   Other Topics Concern    Not on file   Social History Narrative     Not on file       FAMILY HISTORY:  Family History   Problem Relation Age of Onset    Hypertension Mother        ALLERGIES AND MEDICATIONS: updated and reviewed.  Review of patient's allergies indicates:   Allergen Reactions    Pcn [penicillins] Swelling     Current Outpatient Medications   Medication Sig Dispense Refill    AFLURIA QD 2019-20,3YR UP,,PF, 60 mcg (15 mcg x 4)/0.5 mL Syrg ADM 0.5ML IM UTD      albuterol 90 mcg/actuation inhaler Inhale 1-2 puffs into the lungs every 6 (six) hours as needed for Wheezing. 1 Inhaler 1    azithromycin (Z-EMILIO) 250 MG tablet Take 2 tablets by mouth on day 1; Take 1 tablet by mouth on days 2-5 (Patient not taking: Reported on 3/18/2020) 6 tablet 0    bismuth subsalicylate (BISMUTH) 262 mg Chew Take by mouth.      erythromycin (ROMYCIN) ophthalmic ointment Place a 1/2 inch ribbon of ointment into the left lower eyelid 3 times daily for 7 days. (Patient not taking: Reported on 3/18/2020) 1 Tube 0    ibuprofen (ADVIL,MOTRIN) 600 MG tablet Take 1 tablet (600 mg total) by mouth every 6 (six) hours as needed for Pain. (Patient not taking: Reported on 3/18/2020) 20 tablet 0    ibuprofen (ADVIL,MOTRIN) 600 MG tablet Take 1 tablet (600 mg total) by mouth every 6 (six) hours as needed for Pain. (Patient not taking: Reported on 3/18/2020) 20 tablet 0    lactulose (CEPHULAC) 20 gram Pack Take 1 packet (20 g total) by mouth 3 (three) times daily as needed (constipation). (Patient not taking: Reported on 3/18/2020) 30 packet 0    naproxen (NAPROSYN) 500 MG tablet Take 1 tablet (500 mg total) by mouth 2 (two) times daily as needed (for pain). (Patient not taking: Reported on 3/18/2020) 30 tablet 0    promethazine-dextromethorphan (PROMETHAZINE-DM) 6.25-15 mg/5 mL Syrp Take 5 mLs by mouth 2 (two) times daily as needed. 120 mL 0     No current facility-administered medications for this visit.        ROS  Review of Systems   Constitutional: Negative for chills and fever.   HENT:  "Positive for congestion and postnasal drip. Negative for hearing loss and sore throat.    Eyes: Negative for visual disturbance.   Respiratory: Positive for cough. Negative for shortness of breath.    Cardiovascular: Negative for chest pain, palpitations and leg swelling.   Gastrointestinal: Negative for abdominal pain, constipation, diarrhea, nausea and vomiting.   Genitourinary: Negative for dysuria, frequency and urgency.   Musculoskeletal: Negative for arthralgias, joint swelling and myalgias.   Skin: Negative for rash and wound.   Neurological: Positive for headaches.   Psychiatric/Behavioral: Negative for agitation and confusion. The patient is not nervous/anxious.          OBJECTIVE     Physical Exam  Vitals:    03/18/20 1356   BP: 120/76   Pulse: 77   Temp: 98.7 °F (37.1 °C)    Body mass index is 38.5 kg/m².  Weight: 121.7 kg (268 lb 4.8 oz)   Height: 5' 10" (177.8 cm)     Physical Exam   Constitutional: He is oriented to person, place, and time. He appears well-developed and well-nourished. No distress.   HENT:   Head: Normocephalic and atraumatic.   Right Ear: Hearing, tympanic membrane and external ear normal.   Left Ear: Hearing, tympanic membrane and external ear normal.   Nose: Nose normal. No rhinorrhea. Right sinus exhibits no maxillary sinus tenderness and no frontal sinus tenderness. Left sinus exhibits no maxillary sinus tenderness and no frontal sinus tenderness.   Mouth/Throat: No uvula swelling. Posterior oropharyngeal erythema present. No posterior oropharyngeal edema.   Eyes: Conjunctivae and EOM are normal. Right eye exhibits no discharge. Left eye exhibits no discharge. No scleral icterus.   Neck: Normal range of motion. Neck supple. No JVD present. No tracheal deviation present.   Cardiovascular: Normal rate, regular rhythm, normal heart sounds and intact distal pulses. Exam reveals no gallop and no friction rub.   No murmur heard.  Pulmonary/Chest: Effort normal and breath sounds normal. " No respiratory distress. He has no wheezes.   Abdominal: Soft. Bowel sounds are normal. He exhibits no distension and no mass. There is no tenderness. There is no rebound and no guarding.   Musculoskeletal: Normal range of motion. He exhibits no edema, tenderness or deformity.   Neurological: He is alert and oriented to person, place, and time. He exhibits normal muscle tone. Coordination normal.   Skin: Skin is warm and dry. No rash noted. No erythema.   Psychiatric: He has a normal mood and affect. His behavior is normal. Judgment and thought content normal.         Health Maintenance       Date Due Completion Date    Lipid Panel 1993 ---    HIV Screening 07/23/2008 ---    TETANUS VACCINE 02/05/2030 2/5/2020            ASSESSMENT     24 y.o. male with     1. Upper respiratory infection with cough and congestion    2. Severe obesity (BMI 35.0-39.9) with comorbidity        PLAN:     1. Upper respiratory infection with cough and congestion  - Continue symptomatic treatment with rest, increase fluid intake, tylenol or ibuprofen PRN fever(temp >/= 100.4) or body aches. Okay to take OTC antihistamines, i.e. Bendaryl, Claritin, Allegra, etc. as needed.  - Okay to gargle with warm, salt water or use throat lozenges as needed  - promethazine-dextromethorphan (PROMETHAZINE-DM) 6.25-15 mg/5 mL Syrp; Take 5 mLs by mouth 2 (two) times daily as needed.  Dispense: 120 mL; Refill: 0    2. Severe obesity (BMI 35.0-39.9) with comorbidity  - Pt aware of importance of eating a prudent diet and exercising        RTC in 1-2 weeks as needed for any acute worsening of current condition or failure to improve       Caitlyn Ham MD  03/18/2020 2:10 PM        No follow-ups on file.

## 2020-03-18 NOTE — LETTER
March 18, 2020      [place] [address] [place] [address]  Phone: [phone]  Fax: [phone]       Patient: Dariusz Garcia   YOB: 1995  Date of Visit: 03/18/2020    To Whom It May Concern:    Sneha Garcia  was at [facility] on 03/18/2020. He may return to work/school on 3/23/2020 with no restrictions. If you have any questions or concerns, or if I can be of further assistance, please do not hesitate to contact me.    Sincerely,    Caitlyn Ham MD

## 2020-03-18 NOTE — PATIENT INSTRUCTIONS
Start symptomatic treatment with rest, increase fluid intake, tylenol or ibuprofen PRN fever(temp >/= 100.4) or body aches. Okay to take OTC antihistamines, i.e. Bendaryl, Claritin, Allegra, Zyrtec, etc. as needed. Please gargle with warm, salt water or use throat lozenges as needed.

## 2020-08-14 DIAGNOSIS — Z11.59 NEED FOR HEPATITIS C SCREENING TEST: ICD-10-CM

## 2020-12-14 ENCOUNTER — HOSPITAL ENCOUNTER (EMERGENCY)
Facility: HOSPITAL | Age: 25
Discharge: HOME | End: 2020-12-14
Attending: EMERGENCY MEDICINE
Payer: COMMERCIAL

## 2020-12-14 VITALS
DIASTOLIC BLOOD PRESSURE: 83 MMHG | HEART RATE: 88 BPM | TEMPERATURE: 99 F | RESPIRATION RATE: 18 BRPM | WEIGHT: 280 LBS | OXYGEN SATURATION: 99 % | SYSTOLIC BLOOD PRESSURE: 125 MMHG | HEIGHT: 70 IN | BODY MASS INDEX: 40.09 KG/M2

## 2020-12-14 DIAGNOSIS — J20.9 ACUTE BRONCHITIS, UNSPECIFIED ORGANISM: Primary | ICD-10-CM

## 2020-12-14 DIAGNOSIS — R06.02 SHORTNESS OF BREATH: ICD-10-CM

## 2020-12-14 DIAGNOSIS — R07.9 CHEST PAIN: ICD-10-CM

## 2020-12-14 LAB
CTP QC/QA: YES
CTP QC/QA: YES
POC MOLECULAR INFLUENZA A AGN: NEGATIVE
POC MOLECULAR INFLUENZA B AGN: NEGATIVE
SARS-COV-2 RDRP RESP QL NAA+PROBE: NEGATIVE

## 2020-12-14 PROCEDURE — 93010 ELECTROCARDIOGRAM REPORT: CPT | Mod: ,,, | Performed by: INTERNAL MEDICINE

## 2020-12-14 PROCEDURE — 87502 INFLUENZA DNA AMP PROBE: CPT

## 2020-12-14 PROCEDURE — 93005 ELECTROCARDIOGRAM TRACING: CPT

## 2020-12-14 PROCEDURE — 63600175 PHARM REV CODE 636 W HCPCS: Performed by: PHYSICIAN ASSISTANT

## 2020-12-14 PROCEDURE — 93010 EKG 12-LEAD: ICD-10-PCS | Mod: ,,, | Performed by: INTERNAL MEDICINE

## 2020-12-14 PROCEDURE — 96372 THER/PROPH/DIAG INJ SC/IM: CPT

## 2020-12-14 PROCEDURE — U0002 COVID-19 LAB TEST NON-CDC: HCPCS | Performed by: PHYSICIAN ASSISTANT

## 2020-12-14 PROCEDURE — 99284 EMERGENCY DEPT VISIT MOD MDM: CPT | Mod: 25

## 2020-12-14 RX ORDER — DEXAMETHASONE SODIUM PHOSPHATE 4 MG/ML
8 INJECTION, SOLUTION INTRA-ARTICULAR; INTRALESIONAL; INTRAMUSCULAR; INTRAVENOUS; SOFT TISSUE
Status: COMPLETED | OUTPATIENT
Start: 2020-12-14 | End: 2020-12-14

## 2020-12-14 RX ORDER — AZITHROMYCIN 250 MG/1
250 TABLET, FILM COATED ORAL DAILY
Qty: 6 TABLET | Refills: 0 | Status: SHIPPED | OUTPATIENT
Start: 2020-12-14

## 2020-12-14 RX ORDER — BENZONATATE 100 MG/1
100 CAPSULE ORAL 3 TIMES DAILY PRN
Qty: 20 CAPSULE | Refills: 0 | Status: SHIPPED | OUTPATIENT
Start: 2020-12-14 | End: 2020-12-24

## 2020-12-14 RX ORDER — ALBUTEROL SULFATE 90 UG/1
1-2 AEROSOL, METERED RESPIRATORY (INHALATION) EVERY 6 HOURS PRN
Qty: 8 G | Refills: 0 | Status: SHIPPED | OUTPATIENT
Start: 2020-12-14 | End: 2021-12-14

## 2020-12-14 RX ADMIN — DEXAMETHASONE SODIUM PHOSPHATE 8 MG: 4 INJECTION INTRA-ARTICULAR; INTRALESIONAL; INTRAMUSCULAR; INTRAVENOUS; SOFT TISSUE at 02:12

## 2020-12-14 NOTE — ED TRIAGE NOTES
Pt c/o midsternal chest pain, SOB that started this AM. Denies cought, fever, abd pain. Pain is 8/10. Reports he had pneumonia in July and this is how it started. Denies taking meds for symptoms PTA

## 2020-12-14 NOTE — DISCHARGE INSTRUCTIONS
Please take new medication as directed and follow discharge instructions provided.  Please make sure to follow-up with your PCP to discuss today's emergency department visit and for further evaluation and management.  Please return emergency department immediately if her symptoms worsen or you develop any additional concerning symptoms.

## 2020-12-14 NOTE — FIRST PROVIDER EVALUATION
Emergency Department TeleTriage Encounter Note      CHIEF COMPLAINT    Chief Complaint   Patient presents with    Chest Pain     Pt c/o midsternal chest pain, SOB that started this AM. Denies cought, fever, abd pain. Pain is 8/10. Reports he had pneumonia in July and this is how it started    Shortness of Breath       VITAL SIGNS   Initial Vitals [12/14/20 1159]   BP Pulse Resp Temp SpO2   (!) 171/79 104 18 98.5 °F (36.9 °C) 96 %      MAP       --            ALLERGIES    Review of patient's allergies indicates:   Allergen Reactions    Pcn [penicillins] Swelling       PROVIDER TRIAGE NOTE  This is a teletriage evaluation of a 25 y.o. male presenting to the ED with c/o chest pain that started this morning.  Also reports difficulty taking deep breath due to pain.  Had pneumonia over the summer, reports similar feeling. Initial orders will be placed and care will be transferred to an alternate provider when patient is roomed for a full evaluation. Any additional orders and the final disposition will be determined by that provider.         ORDERS  Labs Reviewed - No data to display    ED Orders (720h ago, onward)    Start Ordered     Status Ordering Provider    12/14/20 1204 12/14/20 1203  EKG 12-lead  Once      Ordered BELLE HANEY    12/14/20 1204 12/14/20 1203  X-Ray Chest PA And Lateral  1 time imaging      Ordered BELLE HANEY            Virtual Visit Note: The provider triage portion of this emergency department evaluation and documentation was performed via [employer], a HIPAA-compliant telemedicine application, in concert with a tele-presenter in the room. A face to face patient evaluation with one of my colleagues will occur once the patient is placed in an emergency department room.      DISCLAIMER: This note was prepared with [employer] voice recognition transcription software. Garbled syntax, mangled pronouns, and other bizarre constructions may be attributed to that software system.

## 2020-12-14 NOTE — Clinical Note
"Dariusz Curtis" Jose was seen and treated in our emergency department on 12/14/2020.  He may return to work on 12/16/2020.       If you have any questions or concerns, please don't hesitate to call.      Warren Gallardo PA-C"

## 2020-12-17 NOTE — ED PROVIDER NOTES
Encounter Date: 12/14/2020       History     Chief Complaint   Patient presents with    Chest Pain     Pt c/o midsternal chest pain, SOB that started this AM. Denies cought, fever, abd pain. Pain is 8/10. Reports he had pneumonia in July and this is how it started    Shortness of Breath     Mr. Garcia is a 25-year-old male patient with no pertinent past medical history that presents to the ED for cough, shortness of breath, chest pain that started this morning.  Patient localizes chest pain to midsternal chest and bilateral chest.  Patient states that he had pneumonia this summer and states that his symptoms feel similar to then.  Denies any fevers, chills, body aches, runny nose, sore throat, abdominal pain, nausea, vomiting, diarrhea, urinary symptoms.        Review of patient's allergies indicates:   Allergen Reactions    Pcn [penicillins] Swelling     Past Medical History:   Diagnosis Date    UTI (urinary tract infection)      Past Surgical History:   Procedure Laterality Date    WRIST FRACTURE SURGERY       Family History   Problem Relation Age of Onset    Hypertension Mother      Social History     Tobacco Use    Smoking status: Never Smoker    Smokeless tobacco: Never Used   Substance Use Topics    Alcohol use: Yes     Comment: occational     Drug use: No     Review of Systems   Constitutional: Negative for fever.   HENT: Negative for sore throat.    Respiratory: Positive for cough and shortness of breath.    Cardiovascular: Positive for chest pain.   Gastrointestinal: Negative for nausea.   Genitourinary: Negative for dysuria.   Musculoskeletal: Negative for back pain.   Skin: Negative for rash.   Neurological: Negative for weakness.   Hematological: Does not bruise/bleed easily.       Physical Exam     Initial Vitals [12/14/20 1159]   BP Pulse Resp Temp SpO2   (!) 171/79 104 18 98.5 °F (36.9 °C) 96 %      MAP       --         Physical Exam    Constitutional: He appears well-developed and  well-nourished. No distress.   HENT:   Head: Normocephalic and atraumatic.   Eyes: Conjunctivae and EOM are normal. Pupils are equal, round, and reactive to light.   Neck: Normal range of motion. Neck supple.   Cardiovascular: Normal rate, regular rhythm and normal heart sounds.   Pulmonary/Chest: Effort normal and breath sounds normal. No respiratory distress. He exhibits tenderness.     Abdominal: Soft. Normal appearance and bowel sounds are normal. There is no abdominal tenderness. There is no rigidity, no rebound and no guarding.   Musculoskeletal: Normal range of motion.   Neurological: He is alert and oriented to person, place, and time.   Skin: Skin is warm, dry and intact. No rash noted.   Psychiatric: He has a normal mood and affect. His speech is normal and behavior is normal. Judgment and thought content normal. Cognition and memory are normal.         ED Course   Procedures  Labs Reviewed   SARS-COV-2 RDRP GENE   POCT INFLUENZA A/B MOLECULAR        ECG Results          EKG 12-lead (Final result)  Result time 12/16/20 21:29:53    Final result by Interface, Lab In [facility] (12/16/20 21:29:53)                 Narrative:    Test Reason : R07.9,    Vent. Rate : 095 BPM     Atrial Rate : 095 BPM     P-R Int : 144 ms          QRS Dur : 100 ms      QT Int : 334 ms       P-R-T Axes : 058 032 013 degrees     QTc Int : 419 ms    Normal sinus rhythm  Cannot rule out Anterior infarct (cited on or before 08-APR-2019)  Abnormal ECG  When compared with ECG of 08-APR-2019 20:30,  No significant change was found  Confirmed by Magro VILLASEÑOR, Naina BERRIOS ([id number]) on 12/16/2020 9:29:44 PM    Referred By:             Confirmed By:Naina Aragon MD                            Imaging Results          X-Ray Chest PA And Lateral (Final result)  Result time 12/14/20 13:35:51    Final result by Kasi Zhou MD (12/14/20 13:35:51)                 Impression:      1. Pulmonary findings are likely related to shallow inspiratory effort and  habitus, no large focal consolidation.      Electronically signed by: Kasi Zhou MD  Date:    12/14/2020  Time:    13:35             Narrative:    EXAMINATION:  XR CHEST PA AND LATERAL    CLINICAL HISTORY:  Chest pain, unspecified    TECHNIQUE:  PA and lateral views of the chest were performed.    COMPARISON:  04/08/2019    FINDINGS:  The cardiomediastinal silhouette is not enlarged.  There is no pleural effusion.  The trachea is midline.  The lungs are symmetrically expanded bilaterally with mildly coarse interstitial attenuation and bilateral basilar subsegmental atelectasis..  No large focal consolidation seen.  There is no pneumothorax.  The osseous structures are unremarkable.                                 Medical Decision Making:   Clinical Tests:   Lab Tests: Ordered and Reviewed  Radiological Study: Ordered and Reviewed  ED Management:  Hemodynamically stable.  Nontoxic and in no acute distress.  Patient is overall well-appearing, pleasant, conversational.  COVID negative.  Flu negative.  Chest x-ray reports no acute process.  Will discharge home with prescriptions for symptomatic relief and PCP follow-up.  Strict ED return precautions given for any worsening or additional concerning symptoms.  Patient verbalizes understanding and is agreeable with plan.                             Clinical Impression:       ICD-10-CM ICD-9-CM   1. Acute bronchitis, unspecified organism  J20.9 466.0   2. Chest pain  R07.9 786.50   3. Shortness of breath  R06.02 786.05                      Disposition:   Disposition: Discharged  Condition: Stable     ED Disposition Condition    Discharge Stable        ED Prescriptions     Medication Sig Dispense Start Date End Date Auth. Provider    azithromycin (Z-EMILIO) 250 MG tablet Take 1 tablet (250 mg total) by mouth once daily. Take first 2 tablets together, then 1 every day until finished. 6 tablet 12/14/2020  Warren Gallardo PA-C    albuterol (PROVENTIL/VENTOLIN HFA) 90  mcg/actuation inhaler Inhale 1-2 puffs into the lungs every 6 (six) hours as needed. Rescue 8 g 12/14/2020 12/14/2021 Warren Gallardo PA-C    benzonatate (TESSALON) 100 MG capsule Take 1 capsule (100 mg total) by mouth 3 (three) times daily as needed. 20 capsule 12/14/2020 12/24/2020 Warren Gallardo PA-C        Follow-up Information     Follow up With Specialties Details Why Contact Info    Caitlyn Ham MD Internal Medicine, Wound Care Schedule an appointment as soon as possible for a visit   [address] [place] [address]  [address]  [place] [id number]  [phone]      [facility] Emergency Medicine Go to  If symptoms worsen [address] [facility] [address] [phone]                                       Warren Gallardo PA-C  12/16/20 3191

## 2021-04-06 ENCOUNTER — PATIENT MESSAGE (OUTPATIENT)
Dept: ADMINISTRATIVE | Facility: HOSPITAL | Age: 26
End: 2021-04-06

## 2021-04-15 ENCOUNTER — PATIENT MESSAGE (OUTPATIENT)
Dept: RESEARCH | Facility: HOSPITAL | Age: 26
End: 2021-04-15

## 2021-07-07 ENCOUNTER — PATIENT MESSAGE (OUTPATIENT)
Dept: ADMINISTRATIVE | Facility: HOSPITAL | Age: 26
End: 2021-07-07

## 2021-10-04 ENCOUNTER — PATIENT MESSAGE (OUTPATIENT)
Dept: ADMINISTRATIVE | Facility: HOSPITAL | Age: 26
End: 2021-10-04

## 2022-03-17 ENCOUNTER — HOSPITAL ENCOUNTER (EMERGENCY)
Facility: HOSPITAL | Age: 27
Discharge: HOME | End: 2022-03-18
Attending: EMERGENCY MEDICINE
Payer: COMMERCIAL

## 2022-03-17 DIAGNOSIS — N45.1 EPIDIDYMITIS: Primary | ICD-10-CM

## 2022-03-17 DIAGNOSIS — N50.819 TESTICULAR PAIN: ICD-10-CM

## 2022-03-17 LAB
BACTERIA #/AREA URNS HPF: NORMAL /HPF
BILIRUB UR QL STRIP: NEGATIVE
CLARITY UR: CLEAR
COLOR UR: YELLOW
GLUCOSE UR QL STRIP: NEGATIVE
HGB UR QL STRIP: NEGATIVE
HYALINE CASTS #/AREA URNS LPF: 0 /LPF
KETONES UR QL STRIP: NEGATIVE
LEUKOCYTE ESTERASE UR QL STRIP: NEGATIVE
MICROSCOPIC COMMENT: NORMAL
NITRITE UR QL STRIP: NEGATIVE
PH UR STRIP: 8 [PH] (ref 5–8)
PROT UR QL STRIP: ABNORMAL
RBC #/AREA URNS HPF: 0 /HPF (ref 0–4)
SP GR UR STRIP: >1.03 (ref 1–1.03)
URN SPEC COLLECT METH UR: ABNORMAL
UROBILINOGEN UR STRIP-ACNC: >=8 EU/DL
WBC #/AREA URNS HPF: 0 /HPF (ref 0–5)

## 2022-03-17 PROCEDURE — 87591 N.GONORRHOEAE DNA AMP PROB: CPT | Performed by: EMERGENCY MEDICINE

## 2022-03-17 PROCEDURE — 87491 CHLMYD TRACH DNA AMP PROBE: CPT | Performed by: EMERGENCY MEDICINE

## 2022-03-17 PROCEDURE — 81000 URINALYSIS NONAUTO W/SCOPE: CPT | Performed by: EMERGENCY MEDICINE

## 2022-03-17 PROCEDURE — 99284 EMERGENCY DEPT VISIT MOD MDM: CPT | Mod: 25

## 2022-03-18 VITALS
WEIGHT: 271 LBS | DIASTOLIC BLOOD PRESSURE: 70 MMHG | RESPIRATION RATE: 16 BRPM | TEMPERATURE: 99 F | SYSTOLIC BLOOD PRESSURE: 135 MMHG | BODY MASS INDEX: 38.8 KG/M2 | HEART RATE: 85 BPM | OXYGEN SATURATION: 99 % | HEIGHT: 70 IN

## 2022-03-18 PROCEDURE — 25000003 PHARM REV CODE 250: Performed by: EMERGENCY MEDICINE

## 2022-03-18 RX ORDER — CIPROFLOXACIN 500 MG/1
500 TABLET ORAL
Status: COMPLETED | OUTPATIENT
Start: 2022-03-18 | End: 2022-03-18

## 2022-03-18 RX ORDER — CIPROFLOXACIN 500 MG/1
500 TABLET ORAL 2 TIMES DAILY
Qty: 20 TABLET | Refills: 0 | Status: SHIPPED | OUTPATIENT
Start: 2022-03-18 | End: 2022-03-28

## 2022-03-18 RX ORDER — IBUPROFEN 600 MG/1
600 TABLET ORAL
Status: COMPLETED | OUTPATIENT
Start: 2022-03-18 | End: 2022-03-18

## 2022-03-18 RX ADMIN — IBUPROFEN 600 MG: 600 TABLET, FILM COATED ORAL at 01:03

## 2022-03-18 RX ADMIN — CIPROFLOXACIN 500 MG: 500 TABLET, FILM COATED ORAL at 01:03

## 2022-03-18 NOTE — ED TRIAGE NOTES
Pt here with c/o pain and swelling to right testicle that began today. Pt reports about 2 weeks ago his toddler hit him in the testicles and he had pain x 1 day. Pt also reports pain to bilateral hands and feet that began today.

## 2022-03-18 NOTE — ED PROVIDER NOTES
Encounter Date: 3/17/2022    SCRIBE #1 NOTE: I, Francesca Luis Enrique, am scribing for, and in the presence of,  Poonam Holliday MD. I have scribed the following portions of the note - Other sections scribed: HPI, ROS.       History     Chief Complaint   Patient presents with    Testicle Pain     Pt complaining of right testicular pain x1 week, pt has [age]-year and thinks he may have had injury by child stepping on groin. Pt states right testicle is more swollen, denies bruising and denies issues urinating. Aox4, VSS, ambulatory     HPI:  This is a 26 year old male with no pertinent PMHX who presents to the ED for evaluation of right testicular pain and swelling for one day. The patient states that he had an isolated incident of right testicular pain two weeks ago after his son stepped on the testicle. States that that pain resolved shortly after it began. He states that the same pain returned today. No trauma or injury noted. Patient states that the pain is worse with palpation of the testicle. He denies abdominal pain, genital sores, or penile discharge. No medications or treatments were attempted prior to arrival. Patient denies concern for STIs and denies any new sexual partners.    The history is provided by the patient. No  was used.     Review of patient's allergies indicates:   Allergen Reactions    Pcn [penicillins] Swelling     Past Medical History:   Diagnosis Date    UTI (urinary tract infection)      Past Surgical History:   Procedure Laterality Date    WRIST FRACTURE SURGERY       Family History   Problem Relation Age of Onset    Hypertension Mother      Social History     Tobacco Use    Smoking status: Never Smoker    Smokeless tobacco: Never Used   Substance Use Topics    Alcohol use: Yes     Comment: occational     Drug use: No     Review of Systems   Constitutional: Negative for fever.   HENT: Negative for sore throat.    Eyes: Negative for visual disturbance.   Respiratory:  Negative for shortness of breath.    Cardiovascular: Negative for chest pain.   Gastrointestinal: Negative for abdominal pain.   Genitourinary: Positive for scrotal swelling (r) and testicular pain (r). Negative for dysuria, genital sores and penile discharge.   Musculoskeletal: Negative for back pain.   Skin: Negative for rash.   Neurological: Negative for headaches.        Tingling of palms and soles       Physical Exam     Initial Vitals [03/17/22 2227]   BP Pulse Resp Temp SpO2   (!) 145/76 91 16 98.3 °F (36.8 °C) 97 %      MAP       --         Physical Exam    Constitutional: Well-developed, Well-nourished, No acute distressed, Alert  HENT: Normocephalic, Atraumatic  Eyes: Conjunctiva normal  Neck: Supple, ROM normal  Cardiac: Regular rate  Pulmonary/Chest wall: No respiratory distress  Abdomen: Soft, nontender, nondistended, no rebound, no guarding, no HSM  : penis circumcised, no external lesions, Mild R testicular pain, no swelling or erythema  Musc: Normal ROM, No obvious joint swelling  Lymph: no inguinal LAD  Neuro: oriented x 3, no focal neurologic deficit, sensation intact to distal extremities  Skin: Pink, warm, dry.  No rashes  Psych: Behavior normal, Mood and affect normal    Previous medical record and nursing documentation reviewed where available.          ED Course   Procedures  Labs Reviewed   URINALYSIS, REFLEX TO URINE CULTURE - Abnormal; Notable for the following components:       Result Value    Specific Gravity, UA >1.030 (*)     Protein, UA 1+ (*)     Urobilinogen, UA >=8.0 (*)     All other components within normal limits    Narrative:     Specimen Source->Urine   C. TRACHOMATIS/N. GONORRHOEAE BY AMP DNA   URINALYSIS MICROSCOPIC    Narrative:     Specimen Source->Urine          Imaging Results          US Scrotum And Testicles (Final result)  Result time 03/18/22 00:10:58    Final result by Gerardo Rodriguez MD (03/18/22 00:10:58)                 Impression:      Prominence and  increased vascularity of the right epididymal body, correlation for epididymitis is needed.    The testicles bilaterally demonstrate sonographic appearance of appropriate echotexture and appropriate flow on color Doppler imaging.      Electronically signed by: Gerardo Rodriguez  Date:    03/18/2022  Time:    00:10             Narrative:    EXAMINATION:  US SCROTUM AND TESTICLES    CLINICAL HISTORY:  Testicular pain, unspecified    TECHNIQUE:  Sonography of the scrotum and testes.    COMPARISON:  None.    FINDINGS:  The right testicle measures approximately 4.9 x 2.5 x 3.2 cm, it demonstrates appropriate echotexture and evidence for appropriate flow on color Doppler imaging.  There is prominence and increased vascularity of the body of the right epididymis, correlation for epididymitis is needed.    The left testicle measures approximately 4.9 x 2.5 x 2.8 cm in size it demonstrates appropriate echotexture and evidence for appropriate flow on color Doppler imaging.  The left epididymis appears unremarkable.                                 Medications - No data to display  Medical Decision Making:   Clinical Tests:   Lab Tests: Ordered and Reviewed  Radiological Study: Ordered and Reviewed  ED Management:  Patient is a 26 year old male with R testicular pain.  Mild trauma a few weeks ago but nothing acute.  Reassuring exam.  UA normal.  Patient reports being  - no concern about STI's.  US without signs of torsion - only mild epididymitis.  Unclear etiology.  Possibly just inflammatory but will treat empirically with cipro.  GC/CT testing pending.  Plan for follow up PCP and/or urology.  Ibuprofen as needed for pain.  Return to the ED if symptoms worsen.           Scribe Attestation:   Scribe #1: I performed the above scribed service and the documentation accurately describes the services I performed. I attest to the accuracy of the note.                 Clinical Impression:   Final diagnoses:  [N50.819] Testicular  pain          I, Poonam Holliday, personally performed the services described in this documentation. All medical record entries made by the scribe were at my direction and in my presence. I have reviewed the chart and agree that the record reflects my personal performance and is accurate and complete.        Poonam Holliday MD  03/19/22 3099

## 2022-03-18 NOTE — FIRST PROVIDER EVALUATION
Emergency Department TeleTriage Encounter Note      CHIEF COMPLAINT    Chief Complaint   Patient presents with    Testicle Pain     Pt complaining of right testicular pain x1 week, pt has [age]-year and thinks he may have had injury by child stepping on groin. Pt states right testicle is more swollen, denies bruising and denies issues urinating. Aox4, VSS, ambulatory       VITAL SIGNS   Initial Vitals [03/17/22 2227]   BP Pulse Resp Temp SpO2   (!) 145/76 91 16 98.3 °F (36.8 °C) 97 %      MAP       --            ALLERGIES    Review of patient's allergies indicates:   Allergen Reactions    Pcn [penicillins] Swelling       PROVIDER TRIAGE NOTE  26-year-old male with traumatic testicular pain for the past week.  Denies fevers or chills.  Denies urinary complaints.      ORDERS  Labs Reviewed   C. TRACHOMATIS/N. GONORRHOEAE BY AMP DNA   URINALYSIS, REFLEX TO URINE CULTURE       ED Orders (720h ago, onward)    Start Ordered     Status Ordering Provider    03/17/22 2220 03/17/22 2219  C. trachomatis/N. gonorrhoeae by AMP DNA [facility]; Urine  STAT         Ordered BRANDAN HOLCOMB    03/17/22 2220 03/17/22 2219  US Scrotum And Testicles  1 time imaging         Ordered BRANDAN HOLCOMB    03/17/22 2219 03/17/22 2219  Urinalysis, Reflex to Urine Culture Urine, Clean Catch  STAT         Ordered BRANDAN HOLCOMB            Virtual Visit Note: The provider triage portion of this emergency department evaluation and documentation was performed via [employer], a HIPAA-compliant telemedicine application, in concert with a tele-presenter in the room. A face to face patient evaluation with one of my colleagues will occur once the patient is placed in an emergency department room.      DISCLAIMER: This note was prepared with [employer] voice recognition transcription software. Garbled syntax, mangled pronouns, and other bizarre constructions may be attributed to that software system.

## 2022-03-19 LAB
C TRACH DNA SPEC QL NAA+PROBE: NOT DETECTED
N GONORRHOEA DNA SPEC QL NAA+PROBE: NOT DETECTED

## 2022-05-30 ENCOUNTER — PATIENT MESSAGE (OUTPATIENT)
Dept: ADMINISTRATIVE | Facility: HOSPITAL | Age: 27
End: 2022-05-30
Payer: COMMERCIAL

## 2024-09-13 ENCOUNTER — HOSPITAL ENCOUNTER (EMERGENCY)
Facility: HOSPITAL | Age: 29
Discharge: HOME | End: 2024-09-13
Attending: EMERGENCY MEDICINE

## 2024-09-13 VITALS
RESPIRATION RATE: 15 BRPM | DIASTOLIC BLOOD PRESSURE: 72 MMHG | OXYGEN SATURATION: 98 % | TEMPERATURE: 98 F | BODY MASS INDEX: 38.74 KG/M2 | WEIGHT: 270 LBS | HEART RATE: 69 BPM | SYSTOLIC BLOOD PRESSURE: 141 MMHG

## 2024-09-13 DIAGNOSIS — T14.8XXA MUSCLE STRAIN: ICD-10-CM
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
## 2024-09-13 PROCEDURE — 99284 EMERGENCY DEPT VISIT MOD MDM: CPT

## 2024-09-13 RX ORDER — METHOCARBAMOL 500 MG/1
1000 TABLET, FILM COATED ORAL 3 TIMES DAILY
Qty: 30 TABLET | Refills: 0 | Status: SHIPPED | OUTPATIENT
Start: 2024-09-13 | End: 2024-09-18

## 2024-09-13 RX ORDER — NAPROXEN 500 MG/1
500 TABLET ORAL 2 TIMES DAILY
Qty: 30 TABLET | Refills: 0 | Status: SHIPPED | OUTPATIENT
Start: 2024-09-13

## 2024-09-13 RX ORDER — LIDOCAINE 50 MG/G
1 PATCH TOPICAL DAILY
Qty: 15 PATCH | Refills: 0 | Status: SHIPPED | OUTPATIENT
Start: 2024-09-13
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
